# Patient Record
Sex: FEMALE | URBAN - METROPOLITAN AREA
[De-identification: names, ages, dates, MRNs, and addresses within clinical notes are randomized per-mention and may not be internally consistent; named-entity substitution may affect disease eponyms.]

---

## 2024-03-01 ENCOUNTER — OFFICE VISIT (OUTPATIENT)
Age: 64
End: 2024-03-01

## 2024-03-01 VITALS
BODY MASS INDEX: 28.19 KG/M2 | HEART RATE: 113 BPM | OXYGEN SATURATION: 93 % | TEMPERATURE: 99.8 F | HEIGHT: 62 IN | SYSTOLIC BLOOD PRESSURE: 160 MMHG | DIASTOLIC BLOOD PRESSURE: 94 MMHG | WEIGHT: 153.2 LBS | RESPIRATION RATE: 17 BRPM

## 2024-03-01 DIAGNOSIS — J10.00 PNEUMONIA DUE TO INFLUENZA A VIRUS: Primary | ICD-10-CM

## 2024-03-01 DIAGNOSIS — R03.0 ELEVATED BLOOD PRESSURE READING IN OFFICE WITHOUT DIAGNOSIS OF HYPERTENSION: ICD-10-CM

## 2024-03-01 DIAGNOSIS — J11.1 INFLUENZA: ICD-10-CM

## 2024-03-01 RX ORDER — PREDNISONE 20 MG/1
20 TABLET ORAL 2 TIMES DAILY
Qty: 10 TABLET | Refills: 0 | Status: SHIPPED | OUTPATIENT
Start: 2024-03-01 | End: 2024-03-06

## 2024-03-01 RX ORDER — LEVOTHYROXINE SODIUM 0.07 MG/1
75 TABLET ORAL DAILY
COMMUNITY

## 2024-03-01 RX ORDER — DOXYCYCLINE HYCLATE 100 MG
100 TABLET ORAL 2 TIMES DAILY
Qty: 20 TABLET | Refills: 0 | Status: SHIPPED | OUTPATIENT
Start: 2024-03-01 | End: 2024-03-11

## 2024-03-01 RX ORDER — ALBUTEROL SULFATE 90 UG/1
2 AEROSOL, METERED RESPIRATORY (INHALATION) EVERY 6 HOURS PRN
Qty: 18 G | Refills: 3 | Status: SHIPPED | OUTPATIENT
Start: 2024-03-01

## 2024-03-01 NOTE — PROGRESS NOTES
Phill Choudhary (:  1960) is a 63 y.o. female, here for evaluation of the following chief complaint(s):  Cough (Cough, fever and body aches x 4 days.)      ASSESSMENT/PLAN:  Visit Diagnoses and Associated Orders       Pneumonia due to influenza A virus    -  Primary         Elevated blood pressure reading in office without diagnosis of hypertension             Influenza             ORDERS WITHOUT AN ASSOCIATED DIAGNOSIS    levothyroxine (SYNTHROID) 75 MCG tablet [4422]      doxycycline hyclate (VIBRA-TABS) 100 MG tablet [2625]      predniSONE (DELTASONE) 20 MG tablet [6496]      albuterol sulfate HFA (PROVENTIL;VENTOLIN;PROAIR) 108 (90 Base) MCG/ACT inhaler [54408]             Summary  - Phill's exam and subjective history support the diagnosis for today's visit.  - She is self-pay and was unable to afford testing. Diagnosis was based on clinical presentation.     - The patient barely speaks English and our  device is not currently working so there was a language barrier present.     Differentials    Acute Sinusitis, Bronchitis, Laryngitis, RSV, and Viral Upper Respiratory Infection    SUBJECTIVE/OBJECTIVE:  HPI    Phill reports that symptoms have been unchanged. Relevant symptoms include body aches, congestion, chest tightness, cough , fatigue, fever, headache, nausea, and sore throat.  She has been around sick close contacts with similar symptoms.     Furthermore, she is self pay and does not speak English.  Her adolescent son is speaking for her.      Vitals:    24 1554   BP: (!) 160/94   Pulse: (!) 113   Resp: 17   Temp: 99.8 °F (37.7 °C)   SpO2: 93%   Weight: 69.5 kg (153 lb 3.2 oz)   Height: 1.575 m (5' 2\")       No results found for this visit on 24.     No orders to display       Review of Systems      Physical Exam  Vitals reviewed.   Constitutional:       General: She is not in acute distress.     Appearance: Normal appearance. She is normal weight. She is ill-appearing.